# Patient Record
(demographics unavailable — no encounter records)

---

## 2024-10-17 NOTE — HISTORY OF PRESENT ILLNESS
[Burning] : burning [Localized] : localized [Constant] : constant [Meds] : meds [Not working due to injury] : Work status: not working due to injury [2] : 2 [1] : 2 [Sharp] : sharp [Physical therapy] : physical therapy [de-identified] : 4/18//24: 61yo M with right knee pain for the past couple of years with no injury. Admits to increasing pain and difficulty with prolonged walking/standing, startup, and stairs. He has tried multiple modalities of conservative treatment including anti-inflammatories, PT/HEP, CSI, and gel injections with Dr. Hilario. Has MRI from Nov. 2022.   6/27/24: Here for follow up.  He would like to proceed with scheduling surgery.  No new issues. He takes steps a little slower. Difficulty riding motorcycle with clutch leg.  9/19/24 2 weeks s/p  R TKA, pain improving, started outpt PT, progressing well 10/17/24 6 weeks s/p R TKA, doing well, pain controlled [] : no [FreeTextEntry1] : rt knee [de-identified] : sitting too long  [de-identified] : 9=3=24 [de-identified] : dheeraj  [de-identified] : 9-3-24

## 2024-10-17 NOTE — REVIEW OF SYSTEMS
[Joint Pain] : joint pain [Joint Stiffness] : joint stiffness [Joint Swelling] : joint swelling [FreeTextEntry9] : rt knee

## 2024-10-17 NOTE — IMAGING
[de-identified] : RLE  0-100 motor intact ehl fhl ta g silt sp dp s s pt toes wwp  xr 3v knee stable TKA

## 2024-10-17 NOTE — ASSESSMENT
[FreeTextEntry1] : 60M 6 weeks s/p R TKA  Continue PT Progress activities as tolerated return 10 weeks   We discussed the patient's progress and they were reminded of their antibiotic prophylaxis. We discussed continued physical therapy and/or a home exercise program. Questions about their knee replacement and future follow up were answered and discussed.

## 2025-01-23 NOTE — ASSESSMENT
[FreeTextEntry1] : 60M 4+ months s/p R TKA  amoxicillin for dental ppx  Continue HEP Activities as tolerated return 3 months   We discussed the patient's progress and they were reminded of their antibiotic prophylaxis. We discussed continued physical therapy and/or a home exercise program. Questions about their knee replacement and future follow up were answered and discussed.

## 2025-01-23 NOTE — IMAGING
[de-identified] : RLE  well healed scar 0-115 no pain able to SLR 5/5 MS sensation intact to light touch  xr 3v knee stable TKA

## 2025-01-23 NOTE — HISTORY OF PRESENT ILLNESS
[2] : 2 [1] : 2 [Burning] : burning [Localized] : localized [Sharp] : sharp [Constant] : constant [Meds] : meds [Physical therapy] : physical therapy [Not working due to injury] : Work status: not working due to injury [de-identified] : 4/18//24: 59yo M with right knee pain for the past couple of years with no injury. Admits to increasing pain and difficulty with prolonged walking/standing, startup, and stairs. He has tried multiple modalities of conservative treatment including anti-inflammatories, PT/HEP, CSI, and gel injections with Dr. Hilario. Has MRI from Nov. 2022.   6/27/24: Here for follow up.  He would like to proceed with scheduling surgery.  No new issues. He takes steps a little slower. Difficulty riding motorcycle with clutch leg.  9/19/24 2 weeks s/p R TKA, pain improving, started outpt PT, progressing well  10/17/24 6 weeks s/p R TKA, doing well, pain controlled  1/23/25 4+ months s/p R TKA, doing well. no functional limitations.  He stretches and exercises knee. [] : no [FreeTextEntry1] : rt knee [FreeTextEntry5] : Follow up RT knee pain. Patient states he is doing great, not really in pain. More stiffness when weather is cold. Has dental work to be filled [FreeTextEntry6] : stiffness [de-identified] : sitting too long  [de-identified] : 9=3=24 [de-identified] : dheeraj  [de-identified] : 9-3-24

## 2025-05-15 NOTE — ASSESSMENT
[FreeTextEntry1] : 61 M 8+ months s/p R TKA  Continue HEP Activity modification as tolerated return in 1 year   We discussed the patient's progress and they were reminded of their antibiotic prophylaxis. We discussed continued physical therapy and/or a home exercise program. Questions about their knee replacement and future follow up were answered and discussed.

## 2025-05-15 NOTE — HISTORY OF PRESENT ILLNESS
[2] : 2 [1] : 2 [Burning] : burning [Localized] : localized [Sharp] : sharp [Constant] : constant [Meds] : meds [Physical therapy] : physical therapy [Not working due to injury] : Work status: not working due to injury [de-identified] : 4/18//24: 61yo M with right knee pain for the past couple of years with no injury. Admits to increasing pain and difficulty with prolonged walking/standing, startup, and stairs. He has tried multiple modalities of conservative treatment including anti-inflammatories, PT/HEP, CSI, and gel injections with Dr. Hilario. Has MRI from Nov. 2022.   6/27/24: Here for follow up.  He would like to proceed with scheduling surgery.  No new issues. He takes steps a little slower. Difficulty riding motorcycle with clutch leg.  9/19/24 2 weeks s/p R TKA, pain improving, started outpt PT, progressing well  10/17/24 6 weeks s/p R TKA, doing well, pain controlled  1/23/25 4+ months s/p R TKA, doing well. no functional limitations.  He stretches and exercises knee.  05/15/2025: 8 months s/p R TKA, doing well. No issues or limitations. [] : no [FreeTextEntry1] : rt knee [FreeTextEntry5] : Follow up RT knee pain. Patient states he is doing great, not really in pain. More stiffness when weather is cold. Has dental work to be filled [FreeTextEntry6] : stiffness [de-identified] : sitting too long  [de-identified] : 9=3=24 [de-identified] : no [de-identified] : dheeraj  [de-identified] : 9-3-24

## 2025-05-15 NOTE — IMAGING
[de-identified] : RLE  well healed scar 0-115 no pain able to SLR 5/5 MS sensation intact to light touch  xr 3v knee stable TKA